# Patient Record
Sex: FEMALE | Race: WHITE | URBAN - METROPOLITAN AREA
[De-identification: names, ages, dates, MRNs, and addresses within clinical notes are randomized per-mention and may not be internally consistent; named-entity substitution may affect disease eponyms.]

---

## 2018-10-11 ENCOUNTER — APPOINTMENT (RX ONLY)
Dept: URBAN - METROPOLITAN AREA CLINIC 322 | Facility: CLINIC | Age: 58
Setting detail: DERMATOLOGY
End: 2018-10-11

## 2018-10-11 DIAGNOSIS — Q27.8 OTHER SPECIFIED CONGENITAL MALFORMATIONS OF PERIPHERAL VASCULAR SYSTEM: ICD-10-CM

## 2018-10-11 PROCEDURE — ? PATIENT SPECIFIC COUNSELING

## 2018-10-11 PROCEDURE — ? VARILITE

## 2018-10-11 ASSESSMENT — LOCATION ZONE DERM: LOCATION ZONE: LIP

## 2018-10-11 ASSESSMENT — LOCATION SIMPLE DESCRIPTION DERM: LOCATION SIMPLE: RIGHT LIP

## 2018-10-11 ASSESSMENT — LOCATION DETAILED DESCRIPTION DERM: LOCATION DETAILED: RIGHT INFERIOR VERMILION LIP

## 2018-10-11 NOTE — PROCEDURE: VARILITE
Wavelength: 532 nm
Post-Care Instructions: I reviewed with the patient in detail post-care instructions. Patient should avoid sunlight and wear sun protection.
Frequency In Hz: 1
Pulse Duration: 5
Price (Use Numbers Only, No Special Characters Or $): 95.0
Detail Level: Simple
Consent: Written consent obtained, risks reviewed including but not limited to crusting, scabbing, blistering, scarring, darker or lighter pigmentary change, systemic reactions, ulceration, incidental hair removal, bruising, and/or incomplete removal.
Endpoint erythema. Vaseline and ice applied. Post care reviewed with patient.
Wavelength: 940 nm
Spot Size In Mm: 1.0 mm
External Cooling Fan Speed: 0
Energy Density: 340 mJ/cm2
Energy Density: 950 J/cm2

## 2018-11-14 ENCOUNTER — APPOINTMENT (RX ONLY)
Dept: URBAN - METROPOLITAN AREA CLINIC 322 | Facility: CLINIC | Age: 58
Setting detail: DERMATOLOGY
End: 2018-11-14

## 2018-11-14 DIAGNOSIS — L66.1 LICHEN PLANOPILARIS: ICD-10-CM

## 2018-11-14 DIAGNOSIS — L66.11 CLASSIC LICHEN PLANOPILARIS: ICD-10-CM

## 2018-11-14 DIAGNOSIS — L90.5 SCAR CONDITIONS AND FIBROSIS OF SKIN: ICD-10-CM

## 2018-11-14 PROCEDURE — ? COUNSELING

## 2018-11-14 PROCEDURE — ? PLATELET RICH PLASMA INJECTION

## 2018-11-14 ASSESSMENT — LOCATION ZONE DERM
LOCATION ZONE: LIP
LOCATION ZONE: FACE

## 2018-11-14 ASSESSMENT — LOCATION DETAILED DESCRIPTION DERM
LOCATION DETAILED: LEFT LATERAL TEMPLE
LOCATION DETAILED: RIGHT UPPER CUTANEOUS LIP

## 2018-11-14 ASSESSMENT — LOCATION SIMPLE DESCRIPTION DERM
LOCATION SIMPLE: RIGHT LIP
LOCATION SIMPLE: LEFT TEMPLE

## 2018-11-14 NOTE — PROCEDURE: PLATELET RICH PLASMA INJECTION
Standard Default Technique For Making Prp: Pt was advised on adequate hydration and eating 24 hours before procedure.  Pt was brought in and in sterile fashion blood was drawn into eclipse 11ml tubes with 24g butterfly needles.  Tubes were mixed adequately and were centrifuged at 3300rpm for a total of 10 min.  PRP was then drawn up in sterile fashion into 3cc syringes.  Temporal Scalp was cleansed with hibiclens and alcohol preparatory wipes.  Injections were made every 5mm-1cm with 0.1cc aliquotes intradermally (3-5mm depth) with 30g 1/2in needle.  A total of 8-10cc injected in scalp.  Hemostasis was obtained at all areas.  Scalp was cleansed with wound wash saline.  Pt advised on Ibuprofen for pain and may continue with normal hair regimen.  Call with any complications experienced.

## 2018-11-14 NOTE — PROCEDURE: PLATELET RICH PLASMA INJECTION
Price (Use Numbers Only, No Special Characters Or $): 008 Price (Use Numbers Only, No Special Characters Or $): 697

## 2018-11-14 NOTE — PROCEDURE: PLATELET RICH PLASMA INJECTION
Standard Default Technique For Making Prp: Pt was advised on adequate hydration and eating 24 hours before procedure.  Pt was brought in and in sterile fashion blood was drawn into eclipse 11ml tubes with 24g butterfly needles.  Tubes were mixed adequately and were centrifuged at 3300rpm for a total of 10 min.  PRP was then drawn up in sterile fashion into 3cc syringes.  Using 32g 1/2in needle subcision was performed and injection with PRP.  Hemostasis was obtained.

## 2018-11-14 NOTE — PROCEDURE: PLATELET RICH PLASMA INJECTION
Post-Care Instructions: After the procedure, take precautions agains sun exposure.  Advised would continue use of minoxidil within 48 hours of treatment.

## 2018-12-06 ENCOUNTER — APPOINTMENT (RX ONLY)
Dept: URBAN - METROPOLITAN AREA CLINIC 322 | Facility: CLINIC | Age: 58
Setting detail: DERMATOLOGY
End: 2018-12-06

## 2018-12-06 DIAGNOSIS — Q27.8 OTHER SPECIFIED CONGENITAL MALFORMATIONS OF PERIPHERAL VASCULAR SYSTEM: ICD-10-CM

## 2018-12-06 PROCEDURE — ? VARILITE

## 2018-12-06 ASSESSMENT — LOCATION ZONE DERM: LOCATION ZONE: LIP

## 2018-12-06 ASSESSMENT — LOCATION SIMPLE DESCRIPTION DERM: LOCATION SIMPLE: RIGHT LIP

## 2018-12-06 ASSESSMENT — LOCATION DETAILED DESCRIPTION DERM: LOCATION DETAILED: RIGHT INFERIOR VERMILION LIP

## 2018-12-06 NOTE — PROCEDURE: VARILITE
Treatment Number: 1
Wavelength: 940 nm
Wavelength: 532 nm
Spot Size In Mm: 1.0 mm
Energy Density: 340 mJ/cm2
External Cooling Fan Speed: 0
Consent: Written consent obtained, risks reviewed including but not limited to crusting, scabbing, blistering, scarring, darker or lighter pigmentary change, systemic reactions, ulceration, incidental hair removal, bruising, and/or incomplete removal.
Price (Use Numbers Only, No Special Characters Or $): 95.0
Pulse Duration: 5
Energy Density: 950 J/cm2
Post-Care Instructions: I reviewed with the patient in detail post-care instructions. Patient should avoid sunlight and wear sun protection.
Detail Level: Simple
Endpoint erythema. Vaseline and ice applied. Post care reviewed with patient.

## 2019-01-09 ENCOUNTER — APPOINTMENT (RX ONLY)
Dept: URBAN - METROPOLITAN AREA CLINIC 322 | Facility: CLINIC | Age: 59
Setting detail: DERMATOLOGY
End: 2019-01-09

## 2019-01-09 DIAGNOSIS — Q27.8 OTHER SPECIFIED CONGENITAL MALFORMATIONS OF PERIPHERAL VASCULAR SYSTEM: ICD-10-CM

## 2019-01-09 DIAGNOSIS — L66.1 LICHEN PLANOPILARIS: ICD-10-CM

## 2019-01-09 DIAGNOSIS — L66.11 CLASSIC LICHEN PLANOPILARIS: ICD-10-CM

## 2019-01-09 PROCEDURE — ? PLATELET RICH PLASMA INJECTION

## 2019-01-09 ASSESSMENT — LOCATION SIMPLE DESCRIPTION DERM
LOCATION SIMPLE: LEFT FOREHEAD
LOCATION SIMPLE: RIGHT LIP

## 2019-01-09 ASSESSMENT — LOCATION ZONE DERM
LOCATION ZONE: FACE
LOCATION ZONE: LIP

## 2019-01-09 ASSESSMENT — LOCATION DETAILED DESCRIPTION DERM
LOCATION DETAILED: LEFT INFERIOR LATERAL FOREHEAD
LOCATION DETAILED: RIGHT UPPER CUTANEOUS LIP

## 2019-01-09 NOTE — PROCEDURE: PLATELET RICH PLASMA INJECTION
Standard Default Technique For Making Prp: Pt was advised on adequate hydration and eating 24 hours before procedure.  Pt was brought in and in sterile fashion blood was drawn into eclipse tubes with 24g butterfly needles.  Tubes were mixed adequately and  were centrifuged at 3300rpm for a total of 10 min.  PRP was then drawn up in sterile fashion into 3cc syringes.  A total of 6cc injected in b/l temporal scalp

## 2019-01-09 NOTE — PROCEDURE: PLATELET RICH PLASMA INJECTION
Standard Default Technique For Making Prp: Pt was advised on adequate hydration and eating 24 hours before procedure.  Pt was brought in and in sterile fashion blood was drawn into eclipse tubes with 24g butterfly needles.  Tubes were mixed adequately and  were centrifuged at 3300rpm for a total of 10 min.  PRP was then drawn up in sterile fashion into 3cc syringes.  Subcision and injection performed with 31g 1/2in needle.  LWC provided/discussed.

## 2019-01-09 NOTE — PROCEDURE: PLATELET RICH PLASMA INJECTION
Price (Use Numbers Only, No Special Characters Or $): 763 Price (Use Numbers Only, No Special Characters Or $): 156

## 2019-04-25 ENCOUNTER — APPOINTMENT (RX ONLY)
Dept: URBAN - METROPOLITAN AREA CLINIC 322 | Facility: CLINIC | Age: 59
Setting detail: DERMATOLOGY
End: 2019-04-25

## 2019-04-25 DIAGNOSIS — Q27.8 OTHER SPECIFIED CONGENITAL MALFORMATIONS OF PERIPHERAL VASCULAR SYSTEM: ICD-10-CM

## 2019-04-25 PROCEDURE — ? VARILITE

## 2019-04-25 ASSESSMENT — LOCATION ZONE DERM: LOCATION ZONE: LIP

## 2019-04-25 ASSESSMENT — LOCATION SIMPLE DESCRIPTION DERM: LOCATION SIMPLE: RIGHT LIP

## 2019-04-25 ASSESSMENT — LOCATION DETAILED DESCRIPTION DERM: LOCATION DETAILED: RIGHT INFERIOR VERMILION LIP

## 2019-04-25 NOTE — PROCEDURE: VARILITE
Price (Use Numbers Only, No Special Characters Or $): 95.0
Wavelength: 940 nm
Consent: Written consent obtained, risks reviewed including but not limited to crusting, scabbing, blistering, scarring, darker or lighter pigmentary change, systemic reactions, ulceration, incidental hair removal, bruising, and/or incomplete removal.
Frequency In Hz: 1
Energy Density: 340 mJ/cm2
Spot Size In Mm: 1.0 mm
Detail Level: Simple
External Cooling Fan Speed: 0
Endpoint erythema. Vaseline and ice applied. Post care reviewed with patient.
Pulse Duration: 5
Wavelength: 532 nm
Post-Care Instructions: I reviewed with the patient in detail post-care instructions. Patient should avoid sunlight and wear sun protection.
Energy Density: 950 J/cm2

## 2019-08-01 ENCOUNTER — APPOINTMENT (RX ONLY)
Dept: URBAN - METROPOLITAN AREA CLINIC 322 | Facility: CLINIC | Age: 59
Setting detail: DERMATOLOGY
End: 2019-08-01

## 2019-08-01 DIAGNOSIS — Q27.8 OTHER SPECIFIED CONGENITAL MALFORMATIONS OF PERIPHERAL VASCULAR SYSTEM: ICD-10-CM

## 2019-08-01 PROCEDURE — ? VARILITE

## 2019-08-01 ASSESSMENT — LOCATION DETAILED DESCRIPTION DERM: LOCATION DETAILED: RIGHT INFERIOR VERMILION LIP

## 2019-08-01 ASSESSMENT — LOCATION SIMPLE DESCRIPTION DERM: LOCATION SIMPLE: RIGHT LIP

## 2019-08-01 ASSESSMENT — LOCATION ZONE DERM: LOCATION ZONE: LIP

## 2019-08-14 ENCOUNTER — APPOINTMENT (RX ONLY)
Dept: URBAN - METROPOLITAN AREA CLINIC 322 | Facility: CLINIC | Age: 59
Setting detail: DERMATOLOGY
End: 2019-08-14

## 2019-08-14 DIAGNOSIS — Q27.8 OTHER SPECIFIED CONGENITAL MALFORMATIONS OF PERIPHERAL VASCULAR SYSTEM: ICD-10-CM

## 2019-08-14 DIAGNOSIS — L66.1 LICHEN PLANOPILARIS: ICD-10-CM

## 2019-08-14 DIAGNOSIS — L66.11 CLASSIC LICHEN PLANOPILARIS: ICD-10-CM

## 2019-08-14 PROCEDURE — ? PLATELET RICH PLASMA INJECTION

## 2019-08-14 ASSESSMENT — LOCATION ZONE DERM
LOCATION ZONE: LIP
LOCATION ZONE: FACE

## 2019-08-14 ASSESSMENT — LOCATION SIMPLE DESCRIPTION DERM
LOCATION SIMPLE: RIGHT LIP
LOCATION SIMPLE: LEFT FOREHEAD

## 2019-08-14 ASSESSMENT — LOCATION DETAILED DESCRIPTION DERM
LOCATION DETAILED: LEFT INFERIOR LATERAL FOREHEAD
LOCATION DETAILED: RIGHT UPPER CUTANEOUS LIP

## 2019-08-14 NOTE — PROCEDURE: PLATELET RICH PLASMA INJECTION
Standard Default Technique For Making Prp: Pt was advised on adequate hydration and eating 24 hours before procedure.  Pt was brought in and in sterile fashion blood was drawn into eclipse tubes with 24g butterfly needles.  Tubes were mixed adequately and  were centrifuged at 3300rpm for a total of 10 min.  PRP was then drawn up in sterile fashion into 3cc syringes.  A total of 5cc injected in b/l temporal scalp

## 2019-08-14 NOTE — PROCEDURE: PLATELET RICH PLASMA INJECTION
Price (Use Numbers Only, No Special Characters Or $): 465 Price (Use Numbers Only, No Special Characters Or $): 047

## 2019-11-14 ENCOUNTER — APPOINTMENT (RX ONLY)
Dept: URBAN - METROPOLITAN AREA CLINIC 322 | Facility: CLINIC | Age: 59
Setting detail: DERMATOLOGY
End: 2019-11-14

## 2019-11-14 DIAGNOSIS — Q27.8 OTHER SPECIFIED CONGENITAL MALFORMATIONS OF PERIPHERAL VASCULAR SYSTEM: ICD-10-CM

## 2019-11-14 PROCEDURE — ? VARILITE

## 2019-11-14 ASSESSMENT — LOCATION SIMPLE DESCRIPTION DERM: LOCATION SIMPLE: RIGHT LIP

## 2019-11-14 ASSESSMENT — LOCATION DETAILED DESCRIPTION DERM: LOCATION DETAILED: RIGHT INFERIOR VERMILION LIP

## 2019-11-14 ASSESSMENT — LOCATION ZONE DERM: LOCATION ZONE: LIP

## 2019-11-14 NOTE — PROCEDURE: VARILITE
Pulse Duration: 5
Energy Density: 950 J/cm2
Post-Care Instructions: I reviewed with the patient in detail post-care instructions. Patient should avoid sunlight and wear sun protection.
Energy Density: 340 mJ/cm2
Endpoint erythema. Vaseline and ice applied. Post care reviewed with patient.
Wavelength: 532 nm
Detail Level: Simple
Price (Use Numbers Only, No Special Characters Or $): 95.0
Spot Size In Mm: 1.0 mm
Frequency In Hz: 1
Treatment Number: 4
Consent: Written consent obtained, risks reviewed including but not limited to crusting, scabbing, blistering, scarring, darker or lighter pigmentary change, systemic reactions, ulceration, incidental hair removal, bruising, and/or incomplete removal.
External Cooling Fan Speed: 0
Wavelength: 940 nm

## 2020-11-25 ENCOUNTER — APPOINTMENT (RX ONLY)
Dept: URBAN - METROPOLITAN AREA CLINIC 322 | Facility: CLINIC | Age: 60
Setting detail: DERMATOLOGY
End: 2020-11-25

## 2020-11-25 VITALS — TEMPERATURE: 95.2 F

## 2020-11-25 DIAGNOSIS — Q27.8 OTHER SPECIFIED CONGENITAL MALFORMATIONS OF PERIPHERAL VASCULAR SYSTEM: ICD-10-CM

## 2020-11-25 PROCEDURE — ? VARILITE

## 2020-11-25 ASSESSMENT — LOCATION DETAILED DESCRIPTION DERM: LOCATION DETAILED: RIGHT INFERIOR VERMILION LIP

## 2020-11-25 ASSESSMENT — LOCATION SIMPLE DESCRIPTION DERM: LOCATION SIMPLE: RIGHT LIP

## 2020-11-25 ASSESSMENT — LOCATION ZONE DERM: LOCATION ZONE: LIP

## 2022-11-17 NOTE — PROCEDURE: VARILITE
Addended by: WINNIE BOBO on: 11/17/2022 02:58 PM     Modules accepted: Orders    
External Cooling Fan Speed: 0
Frequency In Hz: 1
Pulse Duration: 5
Wavelength: 532 nm
Spot Size In Mm: 1.0 mm
Detail Level: Simple
Post-Care Instructions: I reviewed with the patient in detail post-care instructions. Patient should avoid sunlight and wear sun protection.
Wavelength: 940 nm
Energy Density: 340 mJ/cm2
Energy Density: 950 J/cm2
Endpoint erythema. Vaseline and ice applied. Post care reviewed with patient.
Consent: Written consent obtained, risks reviewed including but not limited to crusting, scabbing, blistering, scarring, darker or lighter pigmentary change, systemic reactions, ulceration, incidental hair removal, bruising, and/or incomplete removal.
Treatment Number: 4
Price (Use Numbers Only, No Special Characters Or $): 95.0

## 2023-11-06 ENCOUNTER — APPOINTMENT (RX ONLY)
Dept: URBAN - METROPOLITAN AREA CLINIC 322 | Facility: CLINIC | Age: 63
Setting detail: DERMATOLOGY
End: 2023-11-06

## 2023-11-06 DIAGNOSIS — Q27.8 OTHER SPECIFIED CONGENITAL MALFORMATIONS OF PERIPHERAL VASCULAR SYSTEM: ICD-10-CM

## 2023-11-06 PROCEDURE — ? PULSED-DYE LASER

## 2023-11-06 PROCEDURE — ? FULL BODY SKIN EXAM - DECLINED

## 2023-11-06 ASSESSMENT — LOCATION DETAILED DESCRIPTION DERM
LOCATION DETAILED: LEFT INFERIOR VERMILION LIP
LOCATION DETAILED: RIGHT UPPER CUTANEOUS LIP

## 2023-11-06 ASSESSMENT — LOCATION SIMPLE DESCRIPTION DERM
LOCATION SIMPLE: LEFT LIP
LOCATION SIMPLE: RIGHT LIP

## 2023-11-06 ASSESSMENT — LOCATION ZONE DERM: LOCATION ZONE: LIP

## 2023-11-06 NOTE — PROCEDURE: PULSED-DYE LASER
Treated Area: small area
Post-Procedure Care: Post care reviewed with patient.
Pulse Duration: 10 ms
Cryogen Time (Ms): 30
Location (Required For Details To Render In Note But Body Touch Will Also Count For First Location): upper cutaneous lip
Pulse Duration: 1.5 ms
Price (Use Numbers Only, No Special Characters Or $): 95
Delay Time (Ms): 20
Fluence In J/Cm2 (Optional): 13
Spot Size: 7 mm
Consent: Written consent obtained, risks reviewed including but not limited to crusting, scabbing, blistering, scarring, darker or lighter pigmentary change, incidental hair removal, bruising, and/or incomplete removal.
Detail Level: Zone
Immediate Endpoint: purpura
Laser Type: Vbeam Perfecta 595nm
Pulse Duration: 6 ms
Fluence In J/Cm2 (Optional): 10

## 2024-10-29 ENCOUNTER — APPOINTMENT (RX ONLY)
Age: 64
Setting detail: DERMATOLOGY
End: 2024-10-29

## 2024-10-29 DIAGNOSIS — L81.0 POSTINFLAMMATORY HYPERPIGMENTATION: ICD-10-CM | Status: INADEQUATELY CONTROLLED

## 2024-10-29 DIAGNOSIS — L72.0 EPIDERMAL CYST: ICD-10-CM | Status: INADEQUATELY CONTROLLED

## 2024-10-29 DIAGNOSIS — D485 NEOPLASM OF UNCERTAIN BEHAVIOR OF SKIN: ICD-10-CM

## 2024-10-29 DIAGNOSIS — H02.83 DERMATOCHALASIS OF EYELID: ICD-10-CM

## 2024-10-29 PROBLEM — H02.831 DERMATOCHALASIS OF RIGHT UPPER EYELID: Status: ACTIVE | Noted: 2024-10-29

## 2024-10-29 PROBLEM — D48.5 NEOPLASM OF UNCERTAIN BEHAVIOR OF SKIN: Status: ACTIVE | Noted: 2024-10-29

## 2024-10-29 PROBLEM — H02.834 DERMATOCHALASIS OF LEFT UPPER EYELID: Status: ACTIVE | Noted: 2024-10-29

## 2024-10-29 PROCEDURE — 11102 TANGNTL BX SKIN SINGLE LES: CPT

## 2024-10-29 PROCEDURE — 11103 TANGNTL BX SKIN EA SEP/ADDL: CPT

## 2024-10-29 PROCEDURE — ? PATIENT SPECIFIC COUNSELING

## 2024-10-29 PROCEDURE — ? BIOPSY BY SHAVE METHOD

## 2024-10-29 PROCEDURE — ? COUNSELING

## 2024-10-29 PROCEDURE — 99203 OFFICE O/P NEW LOW 30 MIN: CPT | Mod: 25

## 2024-10-29 ASSESSMENT — LOCATION DETAILED DESCRIPTION DERM
LOCATION DETAILED: LEFT LATERAL SUPERIOR EYELID
LOCATION DETAILED: RIGHT MEDIAL SUPERIOR EYELID
LOCATION DETAILED: RIGHT INFERIOR CENTRAL MALAR CHEEK
LOCATION DETAILED: RIGHT MEDIAL SUPERIOR EYELID
LOCATION DETAILED: RIGHT LATERAL SUPERIOR EYELID
LOCATION DETAILED: RIGHT CENTRAL MALAR CHEEK
LOCATION DETAILED: LEFT LATERAL SUPERIOR EYELID
LOCATION DETAILED: RIGHT INFERIOR FOREHEAD
LOCATION DETAILED: LEFT MEDIAL SUPERIOR EYELID
LOCATION DETAILED: RIGHT ORAL COMMISSURE
LOCATION DETAILED: RIGHT LATERAL SUPERIOR EYELID
LOCATION DETAILED: LEFT MEDIAL SUPERIOR EYELID

## 2024-10-29 ASSESSMENT — LOCATION SIMPLE DESCRIPTION DERM
LOCATION SIMPLE: LEFT SUPERIOR EYELID
LOCATION SIMPLE: RIGHT LIP
LOCATION SIMPLE: RIGHT FOREHEAD
LOCATION SIMPLE: RIGHT SUPERIOR EYELID
LOCATION SIMPLE: RIGHT SUPERIOR EYELID
LOCATION SIMPLE: RIGHT CHEEK
LOCATION SIMPLE: LEFT SUPERIOR EYELID

## 2024-10-29 ASSESSMENT — LOCATION ZONE DERM
LOCATION ZONE: EYELID
LOCATION ZONE: FACE
LOCATION ZONE: LIP
LOCATION ZONE: EYELID

## 2024-12-26 ENCOUNTER — APPOINTMENT (OUTPATIENT)
Age: 64
Setting detail: DERMATOLOGY
End: 2024-12-26

## 2024-12-26 PROBLEM — C44.329 SQUAMOUS CELL CARCINOMA OF SKIN OF OTHER PARTS OF FACE: Status: ACTIVE | Noted: 2024-12-26

## 2024-12-26 PROCEDURE — 12052 INTMD RPR FACE/MM 2.6-5.0 CM: CPT

## 2024-12-26 PROCEDURE — ? MOHS SURGERY

## 2024-12-26 PROCEDURE — 17311 MOHS 1 STAGE H/N/HF/G: CPT

## 2025-01-02 ENCOUNTER — APPOINTMENT (OUTPATIENT)
Age: 65
Setting detail: DERMATOLOGY
End: 2025-01-02

## 2025-01-02 DIAGNOSIS — Z48.02 ENCOUNTER FOR REMOVAL OF SUTURES: ICD-10-CM

## 2025-01-02 PROCEDURE — ? SUTURE REMOVAL (GLOBAL PERIOD)

## 2025-01-02 PROCEDURE — 99024 POSTOP FOLLOW-UP VISIT: CPT

## 2025-01-02 ASSESSMENT — LOCATION ZONE DERM: LOCATION ZONE: FACE

## 2025-01-02 ASSESSMENT — LOCATION SIMPLE DESCRIPTION DERM: LOCATION SIMPLE: RIGHT CHEEK

## 2025-01-02 ASSESSMENT — LOCATION DETAILED DESCRIPTION DERM: LOCATION DETAILED: RIGHT CENTRAL MALAR CHEEK

## 2025-01-08 ENCOUNTER — APPOINTMENT (OUTPATIENT)
Age: 65
Setting detail: DERMATOLOGY
End: 2025-01-08

## 2025-01-08 PROBLEM — D04.39 CARCINOMA IN SITU OF SKIN OF OTHER PARTS OF FACE: Status: ACTIVE | Noted: 2025-01-08

## 2025-01-08 PROCEDURE — 17311 MOHS 1 STAGE H/N/HF/G: CPT

## 2025-01-08 PROCEDURE — ? MOHS SURGERY

## 2025-01-08 PROCEDURE — 14040 TIS TRNFR F/C/C/M/N/A/G/H/F: CPT

## 2025-01-15 ENCOUNTER — APPOINTMENT (OUTPATIENT)
Age: 65
Setting detail: DERMATOLOGY
End: 2025-01-15

## 2025-01-15 DIAGNOSIS — Z48.02 ENCOUNTER FOR REMOVAL OF SUTURES: ICD-10-CM

## 2025-01-15 PROCEDURE — ? SUTURE REMOVAL (GLOBAL PERIOD)

## 2025-01-15 PROCEDURE — 99024 POSTOP FOLLOW-UP VISIT: CPT

## 2025-01-15 ASSESSMENT — LOCATION SIMPLE DESCRIPTION DERM
LOCATION SIMPLE: RIGHT FOREHEAD
LOCATION SIMPLE: RIGHT FOREHEAD

## 2025-01-15 ASSESSMENT — LOCATION DETAILED DESCRIPTION DERM
LOCATION DETAILED: RIGHT INFERIOR FOREHEAD
LOCATION DETAILED: RIGHT INFERIOR FOREHEAD

## 2025-01-15 ASSESSMENT — LOCATION ZONE DERM
LOCATION ZONE: FACE
LOCATION ZONE: FACE

## 2025-01-17 ENCOUNTER — APPOINTMENT (OUTPATIENT)
Age: 65
Setting detail: DERMATOLOGY
End: 2025-01-17

## 2025-01-17 DIAGNOSIS — B07.8 OTHER VIRAL WARTS: ICD-10-CM

## 2025-01-17 DIAGNOSIS — L82.1 OTHER SEBORRHEIC KERATOSIS: ICD-10-CM

## 2025-01-17 DIAGNOSIS — D22 MELANOCYTIC NEVI: ICD-10-CM

## 2025-01-17 DIAGNOSIS — Z86.007 PERSONAL HISTORY OF IN-SITU NEOPLASM OF SKIN: ICD-10-CM

## 2025-01-17 DIAGNOSIS — Z85.828 PERSONAL HISTORY OF OTHER MALIGNANT NEOPLASM OF SKIN: ICD-10-CM

## 2025-01-17 DIAGNOSIS — Z71.89 OTHER SPECIFIED COUNSELING: ICD-10-CM

## 2025-01-17 DIAGNOSIS — L81.4 OTHER MELANIN HYPERPIGMENTATION: ICD-10-CM

## 2025-01-17 DIAGNOSIS — D18.0 HEMANGIOMA: ICD-10-CM

## 2025-01-17 PROBLEM — D18.01 HEMANGIOMA OF SKIN AND SUBCUTANEOUS TISSUE: Status: ACTIVE | Noted: 2025-01-17

## 2025-01-17 PROBLEM — D22.5 MELANOCYTIC NEVI OF TRUNK: Status: ACTIVE | Noted: 2025-01-17

## 2025-01-17 PROCEDURE — ? ADDITIONAL NOTES

## 2025-01-17 PROCEDURE — ? BENIGN DESTRUCTION

## 2025-01-17 PROCEDURE — ? COUNSELING

## 2025-01-17 PROCEDURE — 17111 DESTRUCTION B9 LESIONS 15/>: CPT | Mod: 79

## 2025-01-17 PROCEDURE — 99213 OFFICE O/P EST LOW 20 MIN: CPT | Mod: 24,25

## 2025-01-17 ASSESSMENT — LOCATION DETAILED DESCRIPTION DERM
LOCATION DETAILED: LEFT SUPERIOR MEDIAL MIDBACK
LOCATION DETAILED: RIGHT LATERAL UPPER BACK
LOCATION DETAILED: LEFT LATERAL UPPER BACK
LOCATION DETAILED: RIGHT ANTERIOR SHOULDER
LOCATION DETAILED: RIGHT INFERIOR UPPER BACK
LOCATION DETAILED: LEFT POSTERIOR SHOULDER
LOCATION DETAILED: LEFT MID PREAURICULAR CHEEK
LOCATION DETAILED: RIGHT LATERAL ABDOMEN
LOCATION DETAILED: LEFT LATERAL ABDOMEN
LOCATION DETAILED: LEFT SUPERIOR UPPER BACK
LOCATION DETAILED: RIGHT MID-UPPER BACK
LOCATION DETAILED: PERIUMBILICAL SKIN
LOCATION DETAILED: RIGHT SUPERIOR MEDIAL UPPER BACK
LOCATION DETAILED: RIGHT SUPERIOR MEDIAL FOREHEAD
LOCATION DETAILED: LEFT CENTRAL MALAR CHEEK
LOCATION DETAILED: RIGHT INFERIOR LATERAL UPPER BACK
LOCATION DETAILED: LEFT INFERIOR MEDIAL UPPER BACK
LOCATION DETAILED: LEFT MID-UPPER BACK
LOCATION DETAILED: LEFT SUPERIOR FOREHEAD
LOCATION DETAILED: RIGHT INFERIOR FOREHEAD
LOCATION DETAILED: RIGHT SUPERIOR MEDIAL MIDBACK
LOCATION DETAILED: RIGHT LATERAL MANDIBULAR CHEEK
LOCATION DETAILED: RIGHT CENTRAL MALAR CHEEK
LOCATION DETAILED: LEFT SUPERIOR LATERAL MIDBACK
LOCATION DETAILED: RIGHT INFERIOR LATERAL MALAR CHEEK
LOCATION DETAILED: LEFT LATERAL MALAR CHEEK
LOCATION DETAILED: RIGHT SUPERIOR LATERAL BUCCAL CHEEK
LOCATION DETAILED: RIGHT MID PREAURICULAR CHEEK
LOCATION DETAILED: STERNUM
LOCATION DETAILED: RIGHT SUPERIOR UPPER BACK
LOCATION DETAILED: LEFT SUPERIOR LATERAL UPPER BACK
LOCATION DETAILED: LEFT INFERIOR HELIX
LOCATION DETAILED: LEFT INFERIOR UPPER BACK
LOCATION DETAILED: RIGHT INFERIOR LATERAL MIDBACK
LOCATION DETAILED: LEFT ANTERIOR PROXIMAL THIGH
LOCATION DETAILED: LEFT ANTERIOR MEDIAL PROXIMAL THIGH
LOCATION DETAILED: RIGHT POSTERIOR SHOULDER
LOCATION DETAILED: LEFT SUPERIOR LATERAL MALAR CHEEK
LOCATION DETAILED: LEFT RIB CAGE

## 2025-01-17 ASSESSMENT — LOCATION ZONE DERM
LOCATION ZONE: TRUNK
LOCATION ZONE: EAR
LOCATION ZONE: ARM
LOCATION ZONE: FACE
LOCATION ZONE: LEG

## 2025-01-17 ASSESSMENT — LOCATION SIMPLE DESCRIPTION DERM
LOCATION SIMPLE: LEFT CHEEK
LOCATION SIMPLE: ABDOMEN
LOCATION SIMPLE: RIGHT SHOULDER
LOCATION SIMPLE: CHEST
LOCATION SIMPLE: LEFT UPPER BACK
LOCATION SIMPLE: LEFT THIGH
LOCATION SIMPLE: RIGHT LOWER BACK
LOCATION SIMPLE: LEFT LOWER BACK
LOCATION SIMPLE: RIGHT UPPER BACK
LOCATION SIMPLE: LEFT SHOULDER
LOCATION SIMPLE: RIGHT FOREHEAD
LOCATION SIMPLE: RIGHT CHEEK
LOCATION SIMPLE: LEFT EAR
LOCATION SIMPLE: LEFT FOREHEAD

## 2025-03-20 ENCOUNTER — APPOINTMENT (OUTPATIENT)
Age: 65
Setting detail: DERMATOLOGY
End: 2025-03-20

## 2025-03-20 DIAGNOSIS — Z85.828 PERSONAL HISTORY OF OTHER MALIGNANT NEOPLASM OF SKIN: ICD-10-CM

## 2025-03-20 DIAGNOSIS — L90.5 SCAR CONDITIONS AND FIBROSIS OF SKIN: ICD-10-CM

## 2025-03-20 PROCEDURE — ? COUNSELING

## 2025-03-20 PROCEDURE — 99212 OFFICE O/P EST SF 10 MIN: CPT | Mod: 24

## 2025-03-20 PROCEDURE — ? ADDITIONAL NOTES

## 2025-03-20 ASSESSMENT — LOCATION SIMPLE DESCRIPTION DERM: LOCATION SIMPLE: RIGHT CHEEK

## 2025-03-20 ASSESSMENT — LOCATION DETAILED DESCRIPTION DERM: LOCATION DETAILED: RIGHT CENTRAL MALAR CHEEK

## 2025-03-20 ASSESSMENT — LOCATION ZONE DERM: LOCATION ZONE: FACE

## 2025-03-20 NOTE — PROCEDURE: ADDITIONAL NOTES
Detail Level: Simple
Render Risk Assessment In Note?: no
Additional Notes: - Right central malar cheek tx via mohs 12/26/24 (pt very happy with other mohs site involving O to L flap above right eyebrow)\\n- Pt presents with concerns regarding \"lumpy ends\" of surgical scar which \"really bother\" her\\n- Rec to cont scar gel and massage\\n- Discussed risks/benefits of  txing with ILK vs. surgical scar revision\\n- Pt opted to rtc for scar revision with Dr. Morton, discussed free of charge

## 2025-04-02 ENCOUNTER — APPOINTMENT (OUTPATIENT)
Age: 65
Setting detail: DERMATOLOGY
End: 2025-04-02

## 2025-04-02 DIAGNOSIS — L90.5 SCAR CONDITIONS AND FIBROSIS OF SKIN: ICD-10-CM

## 2025-04-02 PROCEDURE — 12052 INTMD RPR FACE/MM 2.6-5.0 CM: CPT | Mod: NC

## 2025-04-02 PROCEDURE — ? SCAR REVISION

## 2025-04-02 PROCEDURE — ? ADDITIONAL NOTES

## 2025-04-02 ASSESSMENT — LOCATION ZONE DERM: LOCATION ZONE: FACE

## 2025-04-02 ASSESSMENT — LOCATION DETAILED DESCRIPTION DERM: LOCATION DETAILED: RIGHT CENTRAL MALAR CHEEK

## 2025-04-02 ASSESSMENT — LOCATION SIMPLE DESCRIPTION DERM: LOCATION SIMPLE: RIGHT CHEEK

## 2025-04-02 NOTE — PROCEDURE: SCAR REVISION
Detail Level: Detailed
Anesthesia Type: 1% lidocaine with epinephrine
Anesthesia Volume In Cc: 5
Hemostasis: Electrodesiccation
Estimated Blood Loss (Cc): minimal
Bill For Surgical Tray: no
Flap Billing Options: Measure Secondary Defect Directly
Repair Type: Intermediate Layered Repair
Simple / Intermediate / Complex Repair - Final Wound Length In Cm: 3.4
Deep Sutures: 5-0 Monocryl
Epidermal Sutures: 5-0 Prolene
Epidermal Closure: running
Suture Removal: 7 days
Wound Care: Petrolatum
Skin Substitute Units (Will Override Primary Defect Units If Greater Than 0): 0
Consent: The rationale for scar revision was explained to the patient and consent was obtained. The risks, benefits and alternatives to therapy were discussed in detail. Specifically, the risks of infection, scarring, bleeding, prolonged wound healing, incomplete removal, allergy to anesthesia, nerve injury and recurrence were addressed. Prior to the procedure, the treatment site was clearly identified and confirmed by the patient. All components of Universal Protocol/PAUSE Rule completed.
Post-Care Instructions: I reviewed with the patient in detail post-care instructions. Patient is not to engage in any heavy lifting, exercise, or swimming for the next 14 days. Should the patient develop any fevers, chills, bleeding, severe pain patient will contact the office immediately.

## 2025-04-02 NOTE — PROCEDURE: ADDITIONAL NOTES
Additional Notes: - Scar revision on right central malar cheek s/p tx via mohs 12/26/24\\n-pt very concerned about \"puckering\" effect on proximal and distal end of surgical scar despite having tried conservative measures of silicone and scar massage\\[id number] from last OV procedure details and risks/benefits/limitations including but not limited to needing to trim more tissue and therefore lengthen the scar and possibility of still having a puckering outcome\\n-pt expressed understanding and opted to proceed with surgical scar revision today
Detail Level: Simple
Render Risk Assessment In Note?: no

## 2025-04-09 ENCOUNTER — APPOINTMENT (OUTPATIENT)
Age: 65
Setting detail: DERMATOLOGY
End: 2025-04-09

## 2025-04-09 DIAGNOSIS — Z48.02 ENCOUNTER FOR REMOVAL OF SUTURES: ICD-10-CM

## 2025-04-09 PROCEDURE — ? SUTURE REMOVAL (GLOBAL PERIOD)

## 2025-04-09 PROCEDURE — 99024 POSTOP FOLLOW-UP VISIT: CPT

## 2025-04-09 ASSESSMENT — LOCATION ZONE DERM: LOCATION ZONE: FACE

## 2025-04-09 ASSESSMENT — LOCATION SIMPLE DESCRIPTION DERM: LOCATION SIMPLE: RIGHT CHEEK

## 2025-04-09 ASSESSMENT — LOCATION DETAILED DESCRIPTION DERM: LOCATION DETAILED: RIGHT CENTRAL MALAR CHEEK

## 2025-04-09 NOTE — PROCEDURE: SUTURE REMOVAL (GLOBAL PERIOD)
Detail Level: Detailed
Add 67026 Cpt? (Important Note: In 2017 The Use Of 00193 Is Being Tracked By Cms To Determine Future Global Period Reimbursement For Global Periods): yes